# Patient Record
Sex: MALE | Race: WHITE | NOT HISPANIC OR LATINO | Employment: OTHER | URBAN - NONMETROPOLITAN AREA
[De-identification: names, ages, dates, MRNs, and addresses within clinical notes are randomized per-mention and may not be internally consistent; named-entity substitution may affect disease eponyms.]

---

## 2020-03-10 DIAGNOSIS — M25.511 ACUTE PAIN OF RIGHT SHOULDER: Primary | ICD-10-CM

## 2020-03-11 ENCOUNTER — OFFICE VISIT (OUTPATIENT)
Dept: ORTHOPEDIC SURGERY | Facility: CLINIC | Age: 75
End: 2020-03-11

## 2020-03-11 VITALS — BODY MASS INDEX: 32.02 KG/M2 | WEIGHT: 204 LBS | HEIGHT: 67 IN
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
## 2020-03-11 DIAGNOSIS — Z96.612 HISTORY OF TOTAL SHOULDER REPLACEMENT, LEFT: ICD-10-CM

## 2020-03-11 DIAGNOSIS — Z96.611 HISTORY OF TOTAL SHOULDER REPLACEMENT, RIGHT: ICD-10-CM

## 2020-03-11 DIAGNOSIS — M25.511 ACUTE PAIN OF RIGHT SHOULDER: Primary | ICD-10-CM

## 2020-03-11 PROCEDURE — 99203 OFFICE O/P NEW LOW 30 MIN: CPT | Performed by: ORTHOPAEDIC SURGERY

## 2020-03-11 NOTE — PROGRESS NOTES
Spenser Duncan is a 74 y.o. male   Primary provider:  Jesica Rubin MD       Chief Complaint   Patient presents with   • Right Shoulder - Pain   • Establish Care     Xray today       HISTORY OF PRESENT ILLNESS:  TSA about 10 years ago.  Had been doing very well.  Fell out of the bathtub about 1 month ago.  Pain has gotten some better in the last couple of weeks.  No numbness or tingling in arm.  Now with dull ache but some sharp pain trying to use his arm.    Pain   This is a new problem. Episode onset: 2/7/20,  fell out of bathtub. The problem occurs intermittently. Associated symptoms include joint swelling. Associated symptoms comments: Aching, clicking, popping. Exacerbated by: driving. He has tried rest for the symptoms. The treatment provided mild relief.      Pain level 10 today.      CONCURRENT MEDICAL HISTORY:    Past Medical History:   Diagnosis Date   • Anxiety    • Depression    • Diabetes (CMS/HCC)    • History of DVT of lower extremity    • History of stomach ulcers        No Known Allergies    No current outpatient medications on file.    Past Surgical History:   Procedure Laterality Date   • BACK SURGERY      x 4   • CARPAL TUNNEL RELEASE Right    • CARPAL TUNNEL RELEASE Left    • TOTAL SHOULDER ARTHROPLASTY Right 10 years ago   • TOTAL SHOULDER ARTHROPLASTY Left 10 years ago       Family History   Problem Relation Age of Onset   • Clotting disorder Other    • Heart disease Other         Social History     Socioeconomic History   • Marital status: Single     Spouse name: Not on file   • Number of children: Not on file   • Years of education: Not on file   • Highest education level: Not on file   Tobacco Use   • Smoking status: Former Smoker     Packs/day: 1.00     Years: 25.00     Pack years: 25.00     Types: Cigarettes   • Smokeless tobacco: Never Used   Substance and Sexual Activity   • Alcohol use: Not Currently        Review of Systems   Constitutional: Negative.    HENT: Negative.    Eyes:  "Negative.    Respiratory: Negative.    Cardiovascular: Negative.    Gastrointestinal: Negative.    Endocrine: Negative.    Genitourinary: Negative.    Musculoskeletal: Positive for joint swelling.   Skin: Negative.    Allergic/Immunologic: Negative.    Neurological: Negative.    Hematological: Bruises/bleeds easily.   Psychiatric/Behavioral: The patient is nervous/anxious.         Depression       PHYSICAL EXAMINATION:       Ht 170.2 cm (67\")   Wt 92.5 kg (204 lb)   BMI 31.95 kg/m²     Physical Exam   Constitutional: He is oriented to person, place, and time. He appears well-developed and well-nourished.   Neurological: He is alert and oriented to person, place, and time.   Psychiatric: He has a normal mood and affect. His behavior is normal. Judgment and thought content normal.       GAIT:     [x]  Normal  []  Antalgic    Assistive device: [x]  None  []  Walker     []  Crutches  []  Cane     []  Wheelchair  []  Stretcher    Right Shoulder Exam     Tenderness   Right shoulder tenderness location: diffuse pain.    Range of Motion   Active abduction: 80   Forward flexion: 90     Muscle Strength   Abduction: 4/5   Supraspinatus: 4/5     Tests   Love test: negative    Other   Erythema: absent  Scars: present  Sensation: normal  Pulse: present      Left Shoulder Exam     Tenderness   The patient is experiencing no tenderness.     Range of Motion   Active abduction: 120   Forward flexion: 120     Muscle Strength   Abduction: 4/5   Supraspinatus: 4/5     Tests   Love test: negative    Other   Erythema: absent  Scars: present  Sensation: normal  Pulse: present               Xr Shoulder 2+ View Right    Result Date: 3/11/2020  Narrative: Ordering Provider:  Rashi Hunter MD Ordering Diagnosis/Indication:  Acute pain of right shoulder Procedure:  XR SHOULDER 2+ VW RIGHT Exam Date:  3/11/20 COMPARISON:  Not applicable, no relevant images available.     Impression:  3 views of the right shoulder show a well " fixed stem for a total shoulder arthroplasty.  There appears to be some medial migration of the glenoid component.  Some cystic changes along the glenoid as well.  No acute bony abnormality.  No fracture or dislocation is noted.  Advanced imaging such as CT would better show the glenoid component as well as the bony structure of the glenoid and any potential loosening. Rashi Hunter MD 3/11/20           ASSESSMENT:    Diagnoses and all orders for this visit:    Acute pain of right shoulder    Fall in bathtub, initial encounter    History of total shoulder replacement, right    History of total shoulder replacement, left          PLAN    Activity as tolerated  Slowly progress as pain allows.  We discussed repeat xrays and possible CT scan if pain persists or worsens.  Repeat xrays of right HUMERUS if needed.    Patient's Body mass index is 31.95 kg/m². BMI is above normal parameters. Recommendations include: exercise counseling and nutrition counseling.    Return if symptoms worsen or fail to improve, for recheck.    Rashi Hunter MD